# Patient Record
Sex: MALE | Race: WHITE | Employment: FULL TIME | URBAN - METROPOLITAN AREA
[De-identification: names, ages, dates, MRNs, and addresses within clinical notes are randomized per-mention and may not be internally consistent; named-entity substitution may affect disease eponyms.]

---

## 2018-01-06 ENCOUNTER — OFFICE VISIT (OUTPATIENT)
Dept: URGENT CARE | Facility: URGENT CARE | Age: 23
End: 2018-01-06
Payer: COMMERCIAL

## 2018-01-06 VITALS
TEMPERATURE: 97.6 F | SYSTOLIC BLOOD PRESSURE: 122 MMHG | OXYGEN SATURATION: 95 % | HEART RATE: 97 BPM | WEIGHT: 132.2 LBS | DIASTOLIC BLOOD PRESSURE: 73 MMHG | BODY MASS INDEX: 17.44 KG/M2

## 2018-01-06 DIAGNOSIS — J01.90 ACUTE SINUSITIS WITH SYMPTOMS > 10 DAYS: Primary | ICD-10-CM

## 2018-01-06 PROCEDURE — 99213 OFFICE O/P EST LOW 20 MIN: CPT | Performed by: PHYSICIAN ASSISTANT

## 2018-01-06 RX ORDER — FLUTICASONE PROPIONATE 50 MCG
1-2 SPRAY, SUSPENSION (ML) NASAL DAILY
Qty: 1 BOTTLE | Refills: 11 | Status: SHIPPED | OUTPATIENT
Start: 2018-01-06

## 2018-01-06 RX ORDER — DOXYCYCLINE HYCLATE 100 MG
100 TABLET ORAL 2 TIMES DAILY
Qty: 20 TABLET | Refills: 0 | Status: SHIPPED | OUTPATIENT
Start: 2018-01-06

## 2018-01-06 ASSESSMENT — ENCOUNTER SYMPTOMS
MUSCULOSKELETAL NEGATIVE: 1
EYES NEGATIVE: 1
NEUROLOGICAL NEGATIVE: 1
CARDIOVASCULAR NEGATIVE: 1
GASTROINTESTINAL NEGATIVE: 1
PSYCHIATRIC NEGATIVE: 1

## 2018-01-06 NOTE — PROGRESS NOTES
SUBJECTIVE:   Shay Cordon is a 22 year old male presenting with a chief complaint of   Chief Complaint   Patient presents with     Sinus Problem     Patient complains of sinus problems   .    Onset of symptoms was 1 week(s) ago.  Course of illness is worsening.    Severity moderate  Current and Associated symptoms: runny nose, dry throat, headache, pressure in head, sneezing  Treatment measures tried include Tylenol/Ibuprofen.  Predisposing factors include None.    Exposed to someone with a sinus infection  Runny nose, stuffy, scratchy throat, headache, ear pain  No fever    Review of Systems   Constitutional:        As in HPI   HENT:        As in HPI   Eyes: Negative.    Respiratory:        As in HPI   Cardiovascular: Negative.    Gastrointestinal: Negative.    Genitourinary: Negative.    Musculoskeletal: Negative.    Skin: Negative.    Neurological: Negative.    Psychiatric/Behavioral: Negative.          Past Medical History:   Diagnosis Date     ADHD (attention deficit hyperactivity disorder)     Dx      Skull fracture (H)      Current Outpatient Prescriptions   Medication Sig Dispense Refill     doxycycline (VIBRA-TABS) 100 MG tablet Take 1 tablet (100 mg) by mouth 2 times daily 20 tablet 0     fluticasone (FLONASE) 50 MCG/ACT spray Spray 1-2 sprays into both nostrils daily 1 Bottle 11     ibuprofen (ADVIL,MOTRIN) 200 MG tablet Take 600 mg by mouth every 4 hours as needed.       MELATONIN PO        Magic Mouthwash (FV std formula) lidocaine visc 2% 2.5mL/5mL & maalox/mylanta w/ simeth 2.5mL/5mL & diphenhydrAMINE 5mg/5mL 1:1:1   Si-3 tsp swish and spit every 4 hours prn pain. (Patient not taking: Reported on 2018) 150 mL 1     Social History   Substance Use Topics     Smoking status: Never Smoker     Smokeless tobacco: Never Used     Alcohol use No       OBJECTIVE  /73 (BP Location: Left arm, Patient Position: Chair, Cuff Size: Adult Regular)  Pulse 97  Temp 97.6  F (36.4  C) (Oral)   Wt 132 lb 3.2 oz (60 kg)  SpO2 95%  BMI 17.44 kg/m2    Physical Exam   Constitutional: He is oriented to person, place, and time and well-developed, well-nourished, and in no distress.   HENT:   Head: Normocephalic and atraumatic.   Right Ear: Ear canal normal. A middle ear effusion is present.   Left Ear: Tympanic membrane and ear canal normal.   Nose: Mucosal edema and rhinorrhea present. Right sinus exhibits no maxillary sinus tenderness and no frontal sinus tenderness. Left sinus exhibits no maxillary sinus tenderness and no frontal sinus tenderness.   Mouth/Throat: Uvula is midline, oropharynx is clear and moist and mucous membranes are normal.   Eyes: Conjunctivae and EOM are normal. Pupils are equal, round, and reactive to light.   Neck: Normal range of motion. Neck supple.   Cardiovascular: Normal rate, regular rhythm and normal heart sounds.    Pulmonary/Chest: Effort normal and breath sounds normal.   Neurological: He is alert and oriented to person, place, and time. Gait normal.   Skin: Skin is warm and dry.   Psychiatric: Mood and affect normal.       Labs:  No results found for this or any previous visit (from the past 24 hour(s)).        ASSESSMENT:      ICD-10-CM    1. Acute sinusitis with symptoms > 10 days J01.90 doxycycline (VIBRA-TABS) 100 MG tablet     fluticasone (FLONASE) 50 MCG/ACT spray        Medical Decision Making:    Patient will wait to start antibiotic for 2 days (until he has hd symptoms for a total of 10 days).    PLAN:    URI Adult:  Tylenol, Ibuprofen, Fluids, Rest, RX sinusitis  Doxycycline 100 mg bid x 10 days , Flonase, and Neti pot    Followup:    If not improving or if condition worsens, follow up with your Primary Care Provider    There are no Patient Instructions on file for this visit.    Yue Hernandez PA-C

## 2018-01-06 NOTE — NURSING NOTE
"Chief Complaint   Patient presents with     Sinus Problem     Patient complains of sinus problems       Initial /73 (BP Location: Left arm, Patient Position: Chair, Cuff Size: Adult Regular)  Pulse 97  Temp 97.6  F (36.4  C) (Oral)  Wt 132 lb 3.2 oz (60 kg)  SpO2 95%  BMI 17.44 kg/m2 Estimated body mass index is 17.44 kg/(m^2) as calculated from the following:    Height as of 6/12/14: 6' 1\" (1.854 m).    Weight as of this encounter: 132 lb 3.2 oz (60 kg).  Medication Reconciliation: complete       Aby Odom    "

## 2018-01-06 NOTE — MR AVS SNAPSHOT
"              After Visit Summary   2018    Shay Cordon    MRN: [id number]           Patient Information     Date Of Birth          1995        Visit Information        Provider Department      2018 1:15 PM Yue Hernandez PA-C [facility]        Today's Diagnoses     Acute sinusitis with symptoms > 10 days    -  1       Follow-ups after your visit        Who to contact     If you have questions or need follow up information about today's clinic visit or your schedule please contact [facility] directly at [phone].  Normal or non-critical lab and imaging results will be communicated to you by VoterTidehart, letter or phone within 4 business days after the clinic has received the results. If you do not hear from us within 7 days, please contact the clinic through VoterTidehart or phone. If you have a critical or abnormal lab result, we will notify you by phone as soon as possible.  Submit refill requests through CrowdMed or call your pharmacy and they will forward the refill request to us. Please allow 3 business days for your refill to be completed.          Additional Information About Your Visit        MyChart Information     CrowdMed lets you send messages to your doctor, view your test results, renew your prescriptions, schedule appointments and more. To sign up, go to [email or web address] . Click on \"Log in\" on the left side of the screen, which will take you to the Welcome page. Then click on \"Sign up Now\" on the right side of the page.     You will be asked to enter the access code listed below, as well as some personal information. Please follow the directions to create your username and password.     Your access code is: [email or web address]  Expires: 2018  1:50 PM     Your access code will  in 90 days. If you need help or a new code, please call your [facility] or [phone].        Care EveryWhere ID     This is your Care EveryWhere ID. " This could be used by other organizations to access your [place] medical records  [id number]        Your Vitals Were     Pulse Temperature Pulse Oximetry BMI (Body Mass Index)          97 97.6  F (36.4  C) (Oral) 95% 17.44 kg/m2         Blood Pressure from Last 3 Encounters:   01/06/18 122/73   11/09/15 114/67   11/19/14 127/78    Weight from Last 3 Encounters:   01/06/18 132 lb 3.2 oz (60 kg)   11/09/15 129 lb (58.5 kg)   11/19/14 134 lb (60.8 kg)              Today, you had the following     No orders found for display         Today's Medication Changes          These changes are accurate as of: 1/6/18  1:50 PM.  If you have any questions, ask your nurse or doctor.               Start taking these medicines.        Dose/Directions    doxycycline 100 MG tablet   Commonly known as:  VIBRA-TABS   Used for:  Acute sinusitis with symptoms > 10 days   Started by:  Yue Hernandez PA-C        Dose:  100 mg   Take 1 tablet (100 mg) by mouth 2 times daily   Quantity:  20 tablet   Refills:  0       fluticasone 50 MCG/ACT spray   Commonly known as:  FLONASE   Used for:  Acute sinusitis with symptoms > 10 days   Started by:  Yue Hernandez PA-C        Dose:  1-2 spray   Spray 1-2 sprays into both nostrils daily   Quantity:  1 Bottle   Refills:  11            Where to get your medicines      These medications were sent to [id number] [place], [place] - [address] [address]     Phone:  [phone]     doxycycline 100 MG tablet    fluticasone 50 MCG/ACT spray                Primary Care Provider    Silvia Bear PA-C       No address on file        Equal Access to Services     [place] AH: Hadii sherine Mcdaniels, waaxda luqadaha, qaybta kaalmada adeegyada, ronnie carrion. So [facility] [phone].    ATENCIÓN: Si habla español, tiene a damico disposición servicios gratuitos de asistencia lingüística. Llame al  [phone].    We comply with applicable federal civil rights laws and [place] laws. We do not discriminate on the basis of race, color, national origin, age, disability, sex, sexual orientation, or gender identity.            Thank you!     Thank you for choosing [facility]  for your care. Our goal is always to provide you with excellent care. Hearing back from our patients is one way we can continue to improve our services. Please take a few minutes to complete the written survey that you may receive in the mail after your visit with us. Thank you!             Your Updated Medication List - Protect others around you: Learn how to safely use, store and throw away your medicines at www.disposemymeds.org.          This list is accurate as of: 18  1:50 PM.  Always use your most recent med list.                   Brand Name Dispense Instructions for use Diagnosis    doxycycline 100 MG tablet    VIBRA-TABS    20 tablet    Take 1 tablet (100 mg) by mouth 2 times daily    Acute sinusitis with symptoms > 10 days       fluticasone 50 MCG/ACT spray    FLONASE    1 Bottle    Spray 1-2 sprays into both nostrils daily    Acute sinusitis with symptoms > 10 days       ibuprofen 200 MG tablet    ADVIL/MOTRIN     Take 600 mg by mouth every 4 hours as needed.        lidocaine visc 2% 2.5mL/5mL & maalox/mylanta w/ simeth 2.5mL/5mL & diphenhydrAMINE 5mg/5mL Susp suspension    [facility] Mouthwash [facility]    150 mL    1:1:1  Si-3 tsp swish and spit every 4 hours prn pain.    Throat pain       MELATONIN PO

## 2018-01-25 ENCOUNTER — ALLIED HEALTH/NURSE VISIT (OUTPATIENT)
Dept: URGENT CARE | Facility: URGENT CARE | Age: 23
End: 2018-01-25
Payer: COMMERCIAL

## 2018-01-25 VITALS
TEMPERATURE: 101.3 F | OXYGEN SATURATION: 98 % | BODY MASS INDEX: 17.28 KG/M2 | DIASTOLIC BLOOD PRESSURE: 76 MMHG | HEART RATE: 114 BPM | WEIGHT: 131 LBS | SYSTOLIC BLOOD PRESSURE: 115 MMHG

## 2018-01-25 DIAGNOSIS — J10.1 INFLUENZA A: Primary | ICD-10-CM

## 2018-01-25 PROCEDURE — 99213 OFFICE O/P EST LOW 20 MIN: CPT | Performed by: PHYSICIAN ASSISTANT

## 2018-01-25 RX ORDER — OSELTAMIVIR PHOSPHATE 75 MG/1
75 CAPSULE ORAL 2 TIMES DAILY
Qty: 10 CAPSULE | Refills: 0 | Status: SHIPPED | OUTPATIENT
Start: 2018-01-25

## 2018-01-25 ASSESSMENT — ENCOUNTER SYMPTOMS
GASTROINTESTINAL NEGATIVE: 1
PSYCHIATRIC NEGATIVE: 1
MUSCULOSKELETAL NEGATIVE: 1
CARDIOVASCULAR NEGATIVE: 1
NEUROLOGICAL NEGATIVE: 1
EYES NEGATIVE: 1

## 2018-01-25 NOTE — LETTER
[facility]  [address]  [facility] [address]  Phone: [phone]    January 25, 2018        Shay Cordon  [address] [place] [address]          To whom it may concern:    RE: Shay Cordon    Patient was seen and treated today at our clinic and missed work.  Please excuse him from work on 1/25 and 1/26.    Please contact me for questions or concerns.      Sincerely,        Yue Hernandez PA-C

## 2018-01-25 NOTE — MR AVS SNAPSHOT
"              After Visit Summary   2018    Shay Cordon    MRN: [id number]           Patient Information     Date Of Birth          1995        Visit Information        Provider Department      2018 5:15 PM Yue Hernandez PA-C [facility]        Today's Diagnoses     Influenza A    -  1       Follow-ups after your visit        Who to contact     If you have questions or need follow up information about today's clinic visit or your schedule please contact [facility] directly at [phone].  Normal or non-critical lab and imaging results will be communicated to you by Jamanhart, letter or phone within 4 business days after the clinic has received the results. If you do not hear from us within 7 days, please contact the clinic through Jamanhart or phone. If you have a critical or abnormal lab result, we will notify you by phone as soon as possible.  Submit refill requests through [email or web address] or call your pharmacy and they will forward the refill request to us. Please allow 3 business days for your refill to be completed.          Additional Information About Your Visit        MyChart Information     [email or web address] lets you send messages to your doctor, view your test results, renew your prescriptions, schedule appointments and more. To sign up, go to [email or web address] . Click on \"Log in\" on the left side of the screen, which will take you to the Welcome page. Then click on \"Sign up Now\" on the right side of the page.     You will be asked to enter the access code listed below, as well as some personal information. Please follow the directions to create your username and password.     Your access code is: [email or web address]  Expires: 2018  1:50 PM     Your access code will  in 90 days. If you need help or a new code, please call your [facility] or [phone].        Care EveryWhere ID     This is your Care EveryWhere ID. This could be used by other " organizations to access your [place] medical records  [id number]        Your Vitals Were     Pulse Temperature Pulse Oximetry BMI (Body Mass Index)          114 101.3  F (38.5  C) (Oral) 98% 17.28 kg/m2         Blood Pressure from Last 3 Encounters:   01/25/18 115/76   01/06/18 122/73   11/09/15 114/67    Weight from Last 3 Encounters:   01/25/18 131 lb (59.4 kg)   01/06/18 132 lb 3.2 oz (60 kg)   11/09/15 129 lb (58.5 kg)              Today, you had the following     No orders found for display         Today's Medication Changes          These changes are accurate as of 1/25/18  6:51 PM.  If you have any questions, ask your nurse or doctor.               Start taking these medicines.        Dose/Directions    oseltamivir 75 MG capsule   Commonly known as:  TAMIFLU   Used for:  Influenza A   Started by:  Yue Hernandez PA-C        Dose:  75 mg   Take 1 capsule (75 mg) by mouth 2 times daily   Quantity:  10 capsule   Refills:  0            Where to get your medicines      These medications were sent to [id number] - [place], [place] - [address] [address]     Phone:  [phone]     oseltamivir 75 MG capsule                Primary Care Provider    Silvia Bear PA-C       No address on file        Equal Access to Services     [place] AH: Hadii sherine arellano hadasho Sofloresali, waaxda luqadaha, qaybta kaalmada adeegyada, ronnie carrion. So [facility] [phone].    ATENCIÓN: Si habla español, tiene a damico disposición servicios gratuitos de asistencia lingüística. Llame al [phone].    We comply with applicable federal civil rights laws and [place] laws. We do not discriminate on the basis of race, color, national origin, age, disability, sex, sexual orientation, or gender identity.            Thank you!     Thank you for choosing [facility]  for your care. Our goal is always to provide you with excellent  care. Hearing back from our patients is one way we can continue to improve our services. Please take a few minutes to complete the written survey that you may receive in the mail after your visit with us. Thank you!             Your Updated Medication List - Protect others around you: Learn how to safely use, store and throw away your medicines at www.disposemymeds.org.          This list is accurate as of 18  6:51 PM.  Always use your most recent med list.                   Brand Name Dispense Instructions for use Diagnosis    doxycycline 100 MG tablet    VIBRA-TABS    20 tablet    Take 1 tablet (100 mg) by mouth 2 times daily    Acute sinusitis with symptoms > 10 days       fluticasone 50 MCG/ACT spray    FLONASE    1 Bottle    Spray 1-2 sprays into both nostrils daily    Acute sinusitis with symptoms > 10 days       ibuprofen 200 MG tablet    ADVIL/MOTRIN     Take 600 mg by mouth every 4 hours as needed.        lidocaine visc 2% 2.5mL/5mL & maalox/mylanta w/ simeth 2.5mL/5mL & diphenhydrAMINE 5mg/5mL Susp suspension    [facility] Mouthwash [facility]    150 mL    1:1:1  Si-3 tsp swish and spit every 4 hours prn pain.    Throat pain       MELATONIN PO           oseltamivir 75 MG capsule    TAMIFLU    10 capsule    Take 1 capsule (75 mg) by mouth 2 times daily    Influenza A

## 2018-01-26 NOTE — PROGRESS NOTES
SUBJECTIVE:   Shay Cordon is a 22 year old male presenting with a chief complaint of   Chief Complaint   Patient presents with     Flu     Patient complains of flu symptoms   .    Onset of symptoms was 1 day(s) ago.  Course of illness is worsening.    Severity moderate  Current and Associated symptoms: fever, chills, headache, cough  Treatment measures tried include Tylenol, last dose mid day without improvement   Predisposing factors include None.    Runny nose  Started this morning  Exposure to flu at work  Sore throat    Review of Systems   Constitutional:        As in HPI   HENT:        As in HPI   Eyes: Negative.    Respiratory:        As in HPI   Cardiovascular: Negative.    Gastrointestinal: Negative.    Genitourinary: Negative.    Musculoskeletal: Negative.    Skin: Negative.    Neurological: Negative.    Psychiatric/Behavioral: Negative.          Past Medical History:   Diagnosis Date     ADHD (attention deficit hyperactivity disorder)     Dx      Skull fracture (H)      Current Outpatient Prescriptions   Medication Sig Dispense Refill     oseltamivir (TAMIFLU) 75 MG capsule Take 1 capsule (75 mg) by mouth 2 times daily 10 capsule 0     doxycycline (VIBRA-TABS) 100 MG tablet Take 1 tablet (100 mg) by mouth 2 times daily 20 tablet 0     fluticasone (FLONASE) 50 MCG/ACT spray Spray 1-2 sprays into both nostrils daily 1 Bottle 11     MELATONIN PO        Magic Mouthwash (FV std formula) lidocaine visc 2% 2.5mL/5mL & maalox/mylanta w/ simeth 2.5mL/5mL & diphenhydrAMINE 5mg/5mL 1:1:1   Si-3 tsp swish and spit every 4 hours prn pain. 150 mL 1     ibuprofen (ADVIL,MOTRIN) 200 MG tablet Take 600 mg by mouth every 4 hours as needed.       Social History   Substance Use Topics     Smoking status: Never Smoker     Smokeless tobacco: Never Used     Alcohol use No       OBJECTIVE  /76 (BP Location: Left arm, Patient Position: Chair, Cuff Size: Adult Regular)  Pulse 114  Temp 101.3  F (38.5  C)  (Oral)  Wt 131 lb (59.4 kg)  SpO2 98%  BMI 17.28 kg/m2    Physical Exam   Constitutional: He is oriented to person, place, and time and well-developed, well-nourished, and in no distress.   HENT:   Head: Normocephalic and atraumatic.   Right Ear: Tympanic membrane and ear canal normal.   Left Ear: Tympanic membrane and ear canal normal.   Nose: Rhinorrhea present.   Mouth/Throat: Uvula is midline and mucous membranes are normal. Posterior oropharyngeal erythema present. No posterior oropharyngeal edema.   Eyes: Conjunctivae and EOM are normal. Pupils are equal, round, and reactive to light.   Neck: Normal range of motion. Neck supple.   Cardiovascular: Normal rate, regular rhythm and normal heart sounds.    Pulmonary/Chest: Effort normal and breath sounds normal.   Neurological: He is alert and oriented to person, place, and time. Gait normal.   Skin: Skin is warm and dry.   Psychiatric: Mood and affect normal.       Labs:  No results found for this or any previous visit (from the past 24 hour(s)).        ASSESSMENT:      ICD-10-CM    1. Influenza A J10.1 oseltamivir (TAMIFLU) 75 MG capsule        Medical Decision Making:    Symptoms are classic for influenza  Patient will see what out of pocket cost of Tamiflu is and decide if he wants to take it.     PLAN:    URI Adult:  Tylenol, Ibuprofen, Fluids and Rest    Followup:    If not improving or if condition worsens, follow up with your Primary Care Provider    There are no Patient Instructions on file for this visit.    Yue Hernandez PA-C

## 2018-01-26 NOTE — NURSING NOTE
"Chief Complaint   Patient presents with     Flu     Patient complains of flu symptoms       Initial /76 (BP Location: Left arm, Patient Position: Chair, Cuff Size: Adult Regular)  Pulse 114  Temp 101.3  F (38.5  C) (Oral)  Wt 131 lb (59.4 kg)  SpO2 98%  BMI 17.28 kg/m2 Estimated body mass index is 17.28 kg/(m^2) as calculated from the following:    Height as of 6/12/14: 6' 1\" (1.854 m).    Weight as of this encounter: 131 lb (59.4 kg).  Medication Reconciliation: complete     Aby Odom    "

## 2019-12-16 ENCOUNTER — OFFICE VISIT (OUTPATIENT)
Dept: URGENT CARE | Facility: URGENT CARE | Age: 24
End: 2019-12-16
Payer: COMMERCIAL

## 2019-12-16 VITALS
BODY MASS INDEX: 17.39 KG/M2 | WEIGHT: 131.8 LBS | HEART RATE: 116 BPM | SYSTOLIC BLOOD PRESSURE: 122 MMHG | TEMPERATURE: 98.4 F | OXYGEN SATURATION: 100 % | RESPIRATION RATE: 20 BRPM | DIASTOLIC BLOOD PRESSURE: 84 MMHG

## 2019-12-16 DIAGNOSIS — H10.022 PINK EYE DISEASE OF LEFT EYE: Primary | ICD-10-CM

## 2019-12-16 PROCEDURE — 99213 OFFICE O/P EST LOW 20 MIN: CPT | Performed by: PHYSICIAN ASSISTANT

## 2019-12-16 RX ORDER — POLYMYXIN B SULFATE AND TRIMETHOPRIM 1; 10000 MG/ML; [USP'U]/ML
1-2 SOLUTION OPHTHALMIC EVERY 6 HOURS
Qty: 3 ML | Refills: 0 | Status: SHIPPED | OUTPATIENT
Start: 2019-12-16 | End: 2019-12-23

## 2019-12-16 ASSESSMENT — ENCOUNTER SYMPTOMS
PALPITATIONS: 0
CHILLS: 0
MUSCULOSKELETAL NEGATIVE: 1
DIZZINESS: 0
LIGHT-HEADEDNESS: 0
HEMATURIA: 0
CARDIOVASCULAR NEGATIVE: 1
EYE DISCHARGE: 1
FREQUENCY: 0
DIARRHEA: 0
HEADACHES: 0
DYSURIA: 0
GASTROINTESTINAL NEGATIVE: 1
FEVER: 0
WHEEZING: 0
RESPIRATORY NEGATIVE: 1
EYE ITCHING: 0
MYALGIAS: 0
ABDOMINAL PAIN: 0
POLYDIPSIA: 0
RHINORRHEA: 0
ADENOPATHY: 0
ENDOCRINE NEGATIVE: 1
CHEST TIGHTNESS: 0
DIAPHORESIS: 0
SHORTNESS OF BREATH: 0
WEAKNESS: 0
VOMITING: 0
NEUROLOGICAL NEGATIVE: 1
CONSTITUTIONAL NEGATIVE: 1
NAUSEA: 0
COUGH: 0
SORE THROAT: 0
EYE REDNESS: 1

## 2019-12-16 ASSESSMENT — PAIN SCALES - GENERAL: PAINLEVEL: NO PAIN (0)

## 2019-12-17 NOTE — PROGRESS NOTES
Chief Complaint:      Chief Complaint   Patient presents with     Conjunctivitis     left eye-started on Sat. but he noticed it on Sun., alot of watery and couldn't open eye yesterday-feel better today per patient        HPI: Shay Cordon is a 24 year old male presenting with left eye discharge, mattering, redness  for the past 2 days.  There has not been exposure to pink eye.  There has not been trauma to the eye.    Shay Cordon does not wear contact lenses.    No problems with vision, or eye pain.    No fever, abdominal pain, diarrhea or vomiting.  No cough, chest pain or shortness of breath.    ROS:     Review of Systems   Constitutional: Negative.  Negative for chills, diaphoresis and fever.   HENT: Negative.  Negative for congestion, ear pain, rhinorrhea and sore throat.    Eyes: Positive for discharge and redness. Negative for itching.   Respiratory: Negative.  Negative for cough, chest tightness, shortness of breath and wheezing.    Cardiovascular: Negative.  Negative for chest pain and palpitations.   Gastrointestinal: Negative.  Negative for abdominal pain, diarrhea, nausea and vomiting.   Endocrine: Negative.  Negative for polydipsia and polyuria.   Genitourinary: Negative for dysuria, frequency, hematuria and urgency.   Musculoskeletal: Negative.  Negative for myalgias.   Skin: Negative for rash.   Allergic/Immunologic: Negative for immunocompromised state.   Neurological: Negative.  Negative for dizziness, weakness, light-headedness and headaches.   Hematological: Negative for adenopathy.           Family History   Family History   Problem Relation Age of Onset     Diabetes Father      Gastrointestinal Disease Maternal Grandmother         Problem history  Patient Active Problem List   Diagnosis     Attention deficit disorder of childhood     Left otitis media        Allergies  Allergies   Allergen Reactions     Amoxicillin Diarrhea        Social History  Social History     Socioeconomic History      Marital status: Single     Spouse name: Not on file     Number of children: Not on file     Years of education: Not on file     Highest education level: Not on file   Occupational History     Not on file   Social Needs     Financial resource strain: Not on file     Food insecurity:     Worry: Not on file     Inability: Not on file     Transportation needs:     Medical: Not on file     Non-medical: Not on file   Tobacco Use     Smoking status: Current Every Day Smoker     Smokeless tobacco: Never Used   Substance and Sexual Activity     Alcohol use: Yes     Drug use: Never     Sexual activity: Never   Lifestyle     Physical activity:     Days per week: Not on file     Minutes per session: Not on file     Stress: Not on file   Relationships     Social connections:     Talks on phone: Not on file     Gets together: Not on file     Attends Zoroastrianism service: Not on file     Active member of club or organization: Not on file     Attends meetings of clubs or organizations: Not on file     Relationship status: Not on file     Intimate partner violence:     Fear of current or ex partner: Not on file     Emotionally abused: Not on file     Physically abused: Not on file     Forced sexual activity: Not on file   Other Topics Concern     Parent/sibling w/ CABG, MI or angioplasty before 65F 55M? Not Asked   Social History Narrative     Not on file        Current Meds    Current Outpatient Medications:      trimethoprim-polymyxin b (POLYTRIM) 59780-5.1 UNIT/ML-% ophthalmic solution, Place 1-2 drops Into the left eye every 6 hours for 7 days, Disp: 3 mL, Rfl: 0     doxycycline (VIBRA-TABS) 100 MG tablet, Take 1 tablet (100 mg) by mouth 2 times daily (Patient not taking: Reported on 12/16/2019), Disp: 20 tablet, Rfl: 0     fluticasone (FLONASE) 50 MCG/ACT spray, Spray 1-2 sprays into both nostrils daily (Patient not taking: Reported on 12/16/2019), Disp: 1 Bottle, Rfl: 11     ibuprofen (ADVIL,MOTRIN) 200 MG tablet, Take 600 mg by  mouth every 4 hours as needed., Disp: , Rfl:      Magic Mouthwash (FV std formula) lidocaine visc 2% 2.5mL/5mL & maalox/mylanta w/ simeth 2.5mL/5mL & diphenhydrAMINE 5mg/5mL, 1:1:1  Si-3 tsp swish and spit every 4 hours prn pain. (Patient not taking: Reported on 2019), Disp: 150 mL, Rfl: 1     MELATONIN PO, , Disp: , Rfl:      oseltamivir (TAMIFLU) 75 MG capsule, Take 1 capsule (75 mg) by mouth 2 times daily (Patient not taking: Reported on 2019), Disp: 10 capsule, Rfl: 0          OBJECTIVE     Vital signs reviewed by Matthew Warren PA-C  /84 (BP Location: Left arm, Patient Position: Sitting, Cuff Size: Adult Regular)   Pulse 116   Temp 98.4  F (36.9  C) (Oral)   Resp 20   Wt 59.8 kg (131 lb 12.8 oz)   SpO2 100%   BMI 17.39 kg/m       Physical Exam  Vitals signs and nursing note reviewed.   Constitutional:       General: He is not in acute distress.     Appearance: He is well-developed. He is not ill-appearing, toxic-appearing or diaphoretic.   HENT:      Head: Normocephalic and atraumatic.      Right Ear: Hearing, tympanic membrane, ear canal and external ear normal. Tympanic membrane is not perforated, erythematous, retracted or bulging.      Left Ear: Hearing, tympanic membrane, ear canal and external ear normal. Tympanic membrane is not perforated, erythematous, retracted or bulging.      Nose: Nose normal. No mucosal edema or rhinorrhea.      Mouth/Throat:      Pharynx: No oropharyngeal exudate or posterior oropharyngeal erythema.      Tonsils: No tonsillar exudate or tonsillar abscesses. Swellin on the right. 0 on the left.   Eyes:      Conjunctiva/sclera:      Right eye: Right conjunctiva is not injected. No chemosis or exudate.     Left eye: Left conjunctiva is injected. No chemosis or exudate.     Pupils: Pupils are equal, round, and reactive to light.   Neck:      Musculoskeletal: Normal range of motion and neck supple.   Cardiovascular:      Rate and Rhythm: Normal rate  and regular rhythm.      Heart sounds: Normal heart sounds, S1 normal and S2 normal. Heart sounds not distant. No murmur. No friction rub. No gallop.    Pulmonary:      Effort: Pulmonary effort is normal. No respiratory distress.      Breath sounds: Normal breath sounds. No decreased breath sounds, wheezing, rhonchi or rales.   Abdominal:      General: Bowel sounds are normal. There is no distension.      Palpations: Abdomen is soft.      Tenderness: There is no abdominal tenderness.   Lymphadenopathy:      Cervical: No cervical adenopathy.   Skin:     General: Skin is warm and dry.      Findings: No rash.   Neurological:      Mental Status: He is alert.      Cranial Nerves: No cranial nerve deficit.   Psychiatric:         Attention and Perception: He is attentive.         Speech: Speech normal.         Behavior: Behavior normal. Behavior is cooperative.         Thought Content: Thought content normal.         Judgment: Judgment normal.            Medical Decision Making:    Differential Diagnosis:  Eye Problem: Bacterial conjunctivitis  Viral conjunctivitis  Allergic conjunctivitis     ASSESSMENT     1. Pink eye disease of left eye         PLAN  Rx for Polytrim drops  Artifical tears for irritation  Warm compresses with baby shampoo for mattering.   If symptoms are not improving follow up with your eye doctor in 2-3 days.  See your eye doctor immediately if symptoms worsen.  If contact wearer, do not wear contacts for next 7 days.  Throw old contacts away.  Worrisome symptoms discussed with instructions to go to the ED.  Patient verbalized understanding and agreed with this plan.     Matthew Warren PA-C  12/16/2019, 6:07 PM

## 2020-08-02 ENCOUNTER — OFFICE VISIT (OUTPATIENT)
Dept: URGENT CARE | Facility: URGENT CARE | Age: 25
End: 2020-08-02
Payer: COMMERCIAL

## 2020-08-02 VITALS
OXYGEN SATURATION: 98 % | SYSTOLIC BLOOD PRESSURE: 125 MMHG | TEMPERATURE: 98.5 F | BODY MASS INDEX: 17.17 KG/M2 | DIASTOLIC BLOOD PRESSURE: 76 MMHG | WEIGHT: 130.13 LBS | RESPIRATION RATE: 20 BRPM | HEART RATE: 92 BPM

## 2020-08-02 DIAGNOSIS — J02.9 SORE THROAT: Primary | ICD-10-CM

## 2020-08-02 DIAGNOSIS — M79.10 MYALGIA: ICD-10-CM

## 2020-08-02 DIAGNOSIS — R09.81 NASAL CONGESTION: ICD-10-CM

## 2020-08-02 LAB
DEPRECATED S PYO AG THROAT QL EIA: NEGATIVE
SPECIMEN SOURCE: NORMAL
SPECIMEN SOURCE: NORMAL
STREP GROUP A PCR: NOT DETECTED

## 2020-08-02 PROCEDURE — 99213 OFFICE O/P EST LOW 20 MIN: CPT | Performed by: NURSE PRACTITIONER

## 2020-08-02 PROCEDURE — 87651 STREP A DNA AMP PROBE: CPT | Performed by: NURSE PRACTITIONER

## 2020-08-02 NOTE — PATIENT INSTRUCTIONS
Patient Education     Coronavirus Disease 2019 (COVID-19): Caring for Yourself or Others  If you or a household member have symptoms of COVID-19, follow the guidelines below. This will help you manage symptoms and keep the virus from spreading.  If you have symptoms of COVID-19    Stay home and contact your care team. They will tell you what to do.    Don t panic. Keep in mind that other illnesses can cause similar symptoms.    Stay away from work, school, and public places.    Limit physical contact with others in your home. Limit visitors. No kissing.    Clean surfaces you touch with disinfectant.    If you need to cough or sneeze, do it into a tissue. Then, throw the tissue into the trash. If you don't have tissues, cough or sneeze into the bend of your elbow    Don t share food or personal items with people in your household. This includes items like eating and drinking utensils, towels, and bedding.    Wear a cloth face mask around other people. It should cover your nose and mouth. You may need to make your own mask using a bandana, T-shirt, or other cloth. See the CDC s instructions on how to make a mask.    If you need to go to a hospital or clinic, call ahead to let them know. Expect the care team to wear masks, gowns, gloves, and eye protection. You may be put in a separate room.    Follow all instructions from your care team.  If you ve been diagnosed with COVID-19    Stay home and away from others, including other people in your home. (This is called self-isolation.) Don t leave home unless you need to get medical care. Don't go to work, school, or public places. Don't use buses, taxis, or other public transportation.    Follow all instructions from your care team.    If you need to go to a hospital or clinic, call ahead to let them know. Expect the care team to wear masks, gowns, gloves, and eye protection. You may be put in a separate room.    Wear a face mask over your nose and mouth. This is to  protect others from your germs. If you can t wear a mask, your caregivers should wear one. You may need to make your own mask using a bandana, T-shirt, or other cloth. See the CDC s instructions on how to make a mask.    Have no contact with pets and other animals.    Don t share food or personal items with people in your household. This includes items like eating and drinking utensils, towels, and bedding.    If you need to cough or sneeze, do it into a tissue. Then, throw the tissue into the trash. If you don't have tissues, cough or sneeze into the bend of your elbow.    Wash your hands often.  Self-care at home  At this time, there is no medicine approved to prevent or treat COVID-19. Experts are testing different medicines, trying to find one that works.  So far, the most proven treatment is to support your body while it fights the virus.    Get plenty of rest.    Drink extra fluids (6 to 8 glasses of liquids each day), unless a doctor has told you not to. Ask your care team which fluids are best for you. Avoid fluids that contain caffeine or alcohol.    Take over-the-counter (OTC) medicine to help reduce pain and fever. Your care team will tell you which OTC medicine to use.  If you ve been in the hospital for COVID-19, follow your care team s instructions. This may include changing positions to help your breathing (such as lying on your belly).  If a test showed that you have COVID-19, you may be asked to donate plasma after you ve recovered. (This is called COVID-19 convalescent plasma donation.) The plasma may contain antibodies to help fight the virus in others. Scientists are testing whether this might be a treatment in the future. For more information, talk to your care team.  Caring for a sick person    Follow all instructions from the care team.    Wash your hands often.    Wear protective clothing as advised.    Make sure the sick person wears a mask. If they can't wear a mask, don't stay in the same  room with them. If you must be in the same room, wear a face mask. Make sure the mask covers both the nose and mouth.    Keep track of the sick person s symptoms.    Clean surfaces often with disinfectant. This includes phones, kitchen counters, fridge door handles, bathroom surfaces, and others.    Don t let anyone share household items with the sick person. This includes eating and drinking tools, towels, sheets, and blankets.    Clean fabrics and laundry well.    Keep other people and pets away from the sick person.  When you can stop self-isolation  When you are sick with COVID-19, you should stay away from other people. This is called self-isolation. The rules for ending self-isolation depend on your health in general.  If you are normally healthy  You can stop self-isolation when all 3 of these are true:    You ve had no fever--and no medicine that reduces fever--for 3 full days (72 hours). And     Your symptoms are better, such as cough or trouble breathing. And     At least 10 days have passed since your symptoms first started.  Talk with your care team before you leave home. They may tell you it s okay to leave, or they may give you different advice.  If you have a weak immune system  If you re being treated for cancer, have an immune disorder (such as HIV), or have had a transplant (organ or bone marrow), follow your care team s instructions. You may be able to end self-isolation when all 3 of these are true:    You ve had no fever--and no medicine that reduces fever--for 3 full days (72 hours). And     Your symptoms are better, such as cough or trouble breathing. And     You ve had 2 tests for COVID-19. The tests happened at least 24 hours apart, and both show that you don t have the virus. (If no tests are available, your care team may tell you to follow the rules for normally healthy people above.)  When to call your care team  Call your care team right away if a sick person has any of these:    Trouble  breathing    Pain or pressure in the chest  If a sick person has any of these, call 911:    Trouble breathing that gets worse    Pain or pressure in the chest that gets worse    Blue tint to lips or face    Fast or irregular heartbeat    Confusion or trouble waking    Fainting or loss of consciousness    Coughing up blood  Going home from the hospital  If you have COVID-19 and were recently in the hospital:    Follow the instructions above for self-care and isolation.    Follow the hospital care team s instructions.    Ask questions if anything is unclear to you. Write down answers so you remember them.  Last modified date: 5/8/2020  This information has been modified by your health care provider with permission from the publisher.      [id number] [facility], [address] [place] [address], [place] [address]. All rights reserved. This information is not intended as a substitute for professional medical care. Always follow your healthcare professional's instructions. This information has been modified by your health care provider with permission from the publisher.

## 2020-08-02 NOTE — PROGRESS NOTES
SUBJECTIVE: 24 year old male with sore throat,+nasal congestion, swollen glands, headache for 1 days. No history of rheumatic fever. Other symptoms: pain while swallowing and strep exposure. He has not tired any OTC medications.    Medications and allergies reviewed.    No Significant PMH.    OBJECTIVE:   Vitals as noted above.  Appears alert and mild distress.  Ears: normal  Oropharynx: moderate erythema and exudates present  Neck: normal, supple and moderate nontender anterior cervical nodes  Lungs: chest clear to IPPA and clear to IPPA    Negative Rapid Strep.    ASSESSMENT: Viral pharyngitis    PLAN: Get tested for COVID 19. Order placed. Gargle, use acetaminophen or other OTC analgesic. See prn.    ALBERT Cornell, CNP  [facility]

## 2020-08-03 ENCOUNTER — AMBULATORY - HEALTHEAST (OUTPATIENT)
Dept: FAMILY MEDICINE | Facility: CLINIC | Age: 25
End: 2020-08-03

## 2020-08-03 ENCOUNTER — OFFICE VISIT (OUTPATIENT)
Dept: URGENT CARE | Facility: URGENT CARE | Age: 25
End: 2020-08-03
Payer: COMMERCIAL

## 2020-08-03 VITALS
BODY MASS INDEX: 17.2 KG/M2 | HEART RATE: 90 BPM | TEMPERATURE: 98.3 F | OXYGEN SATURATION: 99 % | WEIGHT: 130.4 LBS | DIASTOLIC BLOOD PRESSURE: 77 MMHG | SYSTOLIC BLOOD PRESSURE: 135 MMHG | RESPIRATION RATE: 20 BRPM

## 2020-08-03 DIAGNOSIS — Z20.822 SUSPECTED COVID-19 VIRUS INFECTION: ICD-10-CM

## 2020-08-03 DIAGNOSIS — B35.6 JOCK ITCH: Primary | ICD-10-CM

## 2020-08-03 PROCEDURE — 99213 OFFICE O/P EST LOW 20 MIN: CPT | Performed by: PHYSICIAN ASSISTANT

## 2020-08-03 RX ORDER — MICONAZOLE NITRATE 20 MG/G
CREAM TOPICAL 2 TIMES DAILY
Qty: 15 G | Refills: 0 | Status: SHIPPED | OUTPATIENT
Start: 2020-08-03

## 2020-08-03 ASSESSMENT — ENCOUNTER SYMPTOMS
DIZZINESS: 0
ENDOCRINE NEGATIVE: 1
WHEEZING: 0
GASTROINTESTINAL NEGATIVE: 1
WEAKNESS: 0
CARDIOVASCULAR NEGATIVE: 1
CONSTITUTIONAL NEGATIVE: 1
VOMITING: 0
SHORTNESS OF BREATH: 0
COUGH: 0
ABDOMINAL PAIN: 0
EYES NEGATIVE: 1
LIGHT-HEADEDNESS: 0
CHILLS: 0
NEUROLOGICAL NEGATIVE: 1
MYALGIAS: 0
POLYDIPSIA: 0
FEVER: 0
DIAPHORESIS: 0
ADENOPATHY: 0
HEADACHES: 0
EYE ITCHING: 0
FREQUENCY: 0
DIARRHEA: 0
NAUSEA: 0
RESPIRATORY NEGATIVE: 1
MUSCULOSKELETAL NEGATIVE: 1
CHEST TIGHTNESS: 0
EYE REDNESS: 0
DYSURIA: 0
HEMATURIA: 0
PALPITATIONS: 0
SORE THROAT: 0
RHINORRHEA: 0
EYE DISCHARGE: 0

## 2020-08-03 ASSESSMENT — PAIN SCALES - GENERAL: PAINLEVEL: NO PAIN (0)

## 2020-08-03 NOTE — PROGRESS NOTES
"Date: 2020 11:47:11  Clinician: Jacinto Avendano  Clinician NPI: [id number]  Patient: Shay Cordon  Patient : 1995  Patient Address: [address] [place] [address]  Patient Phone: [phone]  Visit Protocol: URI  Patient Summary:  Shay is a 24 year old ( : 1995 ) male who initiated a Visit for COVID-19 (Coronavirus) evaluation and screening. When asked the question \"Please sign me up to receive news, health information and promotions. \", Shay responded \"No\".    Shay states his symptoms started gradually 3-4 days ago.   His symptoms consist of tooth pain, ageusia, diarrhea, myalgia, a sore throat, anosmia, nasal congestion, rhinitis, malaise, and a headache.   Symptom details     Nasal secretions: The color of his mucus is white, green, and yellow.    Sore throat: Shay reports having mild throat pain (1-3 on a 10 point pain scale), has exudate on his tonsils, and can swallow liquids. He is not sure if the lymph nodes in his neck are enlarged. A rash has not appeared on the skin since the sore throat started.     Headache: He states the headache is mild (1-3 on a 10 point pain scale).     Tooth pain: The tooth pain is caused by a cavity, recent dental work, or other mouth problems.      Shay denies having wheezing, nausea, facial pain or pressure, fever, cough, vomiting, ear pain, and chills. He also denies having recent facial or sinus surgery in the past 60 days, double sickening (worsening symptoms after initial improvement), taking antibiotic medication in the past month, and having a sinus infection within the past year. He is not experiencing dyspnea.   Precipitating events  Within the past week, Shay has been exposed to someone with strep throat. He has not recently been exposed to someone with influenza. Shay has been in close contact with the following high risk individuals: adults 65 or older, people with asthma, heart disease or diabetes, immunocompromised people, and " children under the age of 5.   Pertinent COVID-19 (Coronavirus) information  In the past 14 days, Shay has not worked in a congregate living setting.   He does not work or volunteer as healthcare worker or a  and does not work or volunteer in a healthcare facility.   Shay also has not lived in a congregate living setting in the past 14 days. He lives with a healthcare worker.   Shay has not had a close contact with a laboratory-confirmed COVID-19 patient within 14 days of symptom onset.   Pertinent medical history  Shay needs a return to work/school note.   Weight: 130 lbs   Shay smokes or uses smokeless tobacco.   Weight: 130 lbs    MEDICATIONS: No current medications, ALLERGIES: amoxicillin  Clinician Response:  Dear Shay,   Your symptoms show that you may have coronavirus (COVID-19). This illness can cause fever, cough and trouble breathing. Many people get a mild case and get better on their own. Some people can get very sick.  What should I do?  We would like to test you for this virus.   1. Please call [phone] to schedule your visit. Explain that you were referred by [facility] to have a COVID-19 test. Be ready to share [facility].  The following will serve as your written order for this COVID Test, ordered by me, for the indication of suspected COVID [Z20.828]: The test will be ordered in [employer], our electronic health record, after you are scheduled. It will show as ordered and authorized by Carlos Walker MD.  Order: COVID-19 (Coronavirus) PCR for SYMPTOMATIC testing from [facility].      2. When it's time for your COVID test:  Stay at least 6 feet away from others. (If someone will drive you to your test, stay in the backseat, as far away from the  as you can.)   Cover your mouth and nose with a mask, tissue or washcloth.  Go straight to the testing site. Don't make any stops on the way there or back.      3.Starting now: Stay home and away from others (self-isolate) until:    "You've had no fever---and no medicine that reduces fever---for 3 full days (72 hours). And...   Your other symptoms have gotten better. For example, your cough or breathing has improved. And...   At least 10 days have passed since your symptoms started.       During this time, don't leave the house except for testing or medical care.   Stay in your own room, even for meals. Use your own bathroom if you can.   Stay away from others in your home. No hugging, kissing or shaking hands. No visitors.  Don't go to work, school or anywhere else.    Clean \"high touch\" surfaces often (doorknobs, counters, handles, etc.). Use a household cleaning spray or wipes. You'll find a full list of  on the EPA website: www.epa.gov/pesticide-registration/list-n-disinfectants-use-against-sars-cov-2.   Cover your mouth and nose with a mask, tissue or washcloth to avoid spreading germs.  Wash your hands and face often. Use soap and water.  Caregivers in these groups are at risk for severe illness due to COVID-19:  o People 65 years and older  o People who live in a nursing home or long-term care facility  o People with chronic disease (lung, heart, cancer, diabetes, kidney, liver, immunologic)  o People who have a weakened immune system, including those who:   Are in cancer treatment  Take medicine that weakens the immune system, such as corticosteroids  Had a bone marrow or organ transplant  Have an immune deficiency  Have poorly controlled HIV or AIDS  Are obese (body mass index of 40 or higher)  Smoke regularly   o Caregivers should wear gloves while washing dishes, handling laundry and cleaning bedrooms and bathrooms.  o Use caution when washing and drying laundry: Don't shake dirty laundry, and use the warmest water setting that you can.  o For more tips, go to www.cdc.gov/coronavirus/2019-ncov/downloads/10Things.pdf.    4.Sign up for [employer]. We know it's scary to hear that you might have COVID-19. We want to track your " symptoms to make sure you're okay over the next 2 weeks. Please look for an email from [employer]---this is a free, online program that we'll use to keep in touch. To sign up, follow the link in the email. Learn more at [email or web address]  How can I take care of myself?   Get lots of rest. Drink extra fluids (unless a doctor has told you not to).   Take Tylenol (acetaminophen) for fever or pain. If you have liver or kidney problems, ask your family doctor if it's okay to take Tylenol.   Adults can take either:    650 mg (two 325 mg pills) every 4 to 6 hours, or...   1,000 mg (two 500 mg pills) every 8 hours as needed.    Note: Don't take more than 3,000 mg in one day. Acetaminophen is found in many medicines (both prescribed and over-the-counter medicines). Read all labels to be sure you don't take too much.   For children, check the Tylenol bottle for the right dose. The dose is based on the child's age or weight.    If you have other health problems (like cancer, heart failure, an organ transplant or severe kidney disease): Call your specialty clinic if you don't feel better in the next 2 days.       Know when to call 911. Emergency warning signs include:    Trouble breathing or shortness of breath Pain or pressure in the chest that doesn't go away Feeling confused like you haven't felt before, or not being able to wake up Bluish-colored lips or face.  Where can I get more information?   [facility] -- About COVID-19: [email or web address]   CDC -- What to Do If You're Sick: www.cdc.gov/coronavirus/2019-ncov/about/steps-when-sick.html   CDC -- Ending Home Isolation: www.cdc.gov/coronavirus/2019-ncov/hcp/disposition-in-home-patients.html   CDC -- Caring for Someone: www.cdc.gov/coronavirus/2019-ncov/if-you-are-sick/care-for-someone.html   [facility] -- Interim Guidance for Hospital Discharge to Home: www.health.[facility].[place]./diseases/coronavirus/hcp/hospdischarge.pdf   [facility]  [place] clinical trials (COVID-19 research studies): clinicalaffairs.[facility]-clinical-trials    Below are the COVID-19 hotlines at the [place] Department of Health ([facility]). Interpreters are available.    For health questions: Call 910-598-9748 or 1-560.969.1959 (7 a.m. to 7 p.m.) For questions about schools and childcare: Call 259-752-3059 or 1-893.353.9837 (7 a.m. to 7 p.m.)       Rapid Strep Test - [place]    I am sorry you are not feeling well. Based on the information provided, it is possible you could have strep throat. When left untreated, strep can spread to other areas of the body and cause a more serious infection.  A strep test is the only way to determine if a bacterial infection or a virus is causing your sore throat. This is done in a lab where a swab of the back of your throat is collected and tested for the bacteria that causes strep.  Since you chose not to use the lab order, please be seen:     In a clinic or urgent care    Within 24 hours     Call [phone] or go to the emergency room if you suddenly develop a rash, are drooling or unable to swallow fluids, if you are having difficulty breathing, or feel that your throat is closing off.   Diagnosis: Pain in throat  Diagnosis ICD: R07.0  [facility] Results: Rapid Strep Test - [place] - Declined  [facility]T[employer] Secondary Results: null

## 2020-08-03 NOTE — PROGRESS NOTES
"Chief Complaint:    Chief Complaint   Patient presents with     STD     sx with \"inflamed for almost 2 weeks\", denies pain, denies discharge       HPI: Shay Cordon is an 24 year old male who presents for scrotum itching and pain.  Symptoms started roughly 1 week ago.  He has not tried anything for the symptoms.  He denies any drainage from the penis.          ROS:      Review of Systems   Constitutional: Negative.  Negative for chills, diaphoresis and fever.   HENT: Negative.  Negative for congestion, ear pain, rhinorrhea and sore throat.    Eyes: Negative.  Negative for discharge, redness and itching.   Respiratory: Negative.  Negative for cough, chest tightness, shortness of breath and wheezing.    Cardiovascular: Negative.  Negative for chest pain and palpitations.   Gastrointestinal: Negative.  Negative for abdominal pain, diarrhea, nausea and vomiting.   Endocrine: Negative.  Negative for polydipsia and polyuria.   Genitourinary: Negative for discharge, dysuria, frequency, hematuria, penile pain, penile swelling, scrotal swelling, testicular pain and urgency.   Musculoskeletal: Negative.  Negative for myalgias.   Skin: Positive for rash.   Allergic/Immunologic: Negative for immunocompromised state.   Neurological: Negative.  Negative for dizziness, weakness, light-headedness and headaches.   Hematological: Negative for adenopathy.        Family History   Family History   Problem Relation Age of Onset     Diabetes Father      Gastrointestinal Disease Maternal Grandmother        Social History  Social History     Socioeconomic History     Marital status: Single     Spouse name: Not on file     Number of children: Not on file     Years of education: Not on file     Highest education level: Not on file   Occupational History     Not on file   Social Needs     Financial resource strain: Not on file     Food insecurity     Worry: Not on file     Inability: Not on file     Transportation needs     Medical: Not on " file     Non-medical: Not on file   Tobacco Use     Smoking status: Current Every Day Smoker     Smokeless tobacco: Never Used   Substance and Sexual Activity     Alcohol use: Yes     Drug use: Never     Sexual activity: Never   Lifestyle     Physical activity     Days per week: Not on file     Minutes per session: Not on file     Stress: Not on file   Relationships     Social connections     Talks on phone: Not on file     Gets together: Not on file     Attends Yazidi service: Not on file     Active member of club or organization: Not on file     Attends meetings of clubs or organizations: Not on file     Relationship status: Not on file     Intimate partner violence     Fear of current or ex partner: Not on file     Emotionally abused: Not on file     Physically abused: Not on file     Forced sexual activity: Not on file   Other Topics Concern     Parent/sibling w/ CABG, MI or angioplasty before 65F 55M? Not Asked   Social History Narrative     Not on file        Surgical History:  No past surgical history on file.     Problem List:  Patient Active Problem List   Diagnosis     Attention deficit disorder of childhood     Left otitis media        Allergies:  Allergies   Allergen Reactions     Amoxicillin Diarrhea        Current Meds:    Current Outpatient Medications:      MELATONIN PO, , Disp: , Rfl:      miconazole (MICATIN) 2 % external cream, Apply topically 2 times daily, Disp: 15 g, Rfl: 0     doxycycline (VIBRA-TABS) 100 MG tablet, Take 1 tablet (100 mg) by mouth 2 times daily (Patient not taking: Reported on 8/2/2020), Disp: 20 tablet, Rfl: 0     fluticasone (FLONASE) 50 MCG/ACT spray, Spray 1-2 sprays into both nostrils daily (Patient not taking: Reported on 12/16/2019), Disp: 1 Bottle, Rfl: 11     ibuprofen (ADVIL,MOTRIN) 200 MG tablet, Take 600 mg by mouth every 4 hours as needed., Disp: , Rfl:      Magic Mouthwash (FV std formula) lidocaine visc 2% 2.5mL/5mL & maalox/mylanta w/ simeth 2.5mL/5mL &  diphenhydrAMINE 5mg/5mL, 1:1:1  Si-3 tsp swish and spit every 4 hours prn pain. (Patient not taking: Reported on 2019), Disp: 150 mL, Rfl: 1     oseltamivir (TAMIFLU) 75 MG capsule, Take 1 capsule (75 mg) by mouth 2 times daily (Patient not taking: Reported on 2019), Disp: 10 capsule, Rfl: 0     PHYSICAL EXAM:     Vital signs noted and reviewed by Matthew Warren PA-C  /77 (BP Location: Left arm, Patient Position: Sitting, Cuff Size: Adult Regular)   Pulse 90   Temp 98.3  F (36.8  C) (Tympanic)   Resp 20   Wt 59.1 kg (130 lb 6.4 oz)   SpO2 99%   BMI 17.20 kg/m       PEFR:    Physical Exam  Vitals signs and nursing note reviewed.   Constitutional:       General: He is not in acute distress.     Appearance: He is well-developed. He is not ill-appearing, toxic-appearing or diaphoretic.   HENT:      Head: Normocephalic and atraumatic.      Right Ear: Hearing, tympanic membrane, ear canal and external ear normal. Tympanic membrane is not perforated, erythematous, retracted or bulging.      Left Ear: Hearing, tympanic membrane, ear canal and external ear normal. Tympanic membrane is not perforated, erythematous, retracted or bulging.      Nose: Nose normal. No mucosal edema or rhinorrhea.      Mouth/Throat:      Pharynx: No oropharyngeal exudate or posterior oropharyngeal erythema.      Tonsils: No tonsillar exudate or tonsillar abscesses. 0 on the right. 0 on the left.   Eyes:      Pupils: Pupils are equal, round, and reactive to light.   Neck:      Musculoskeletal: Normal range of motion and neck supple.   Cardiovascular:      Rate and Rhythm: Normal rate and regular rhythm.      Heart sounds: Normal heart sounds, S1 normal and S2 normal. Heart sounds not distant. No murmur. No friction rub. No gallop.    Pulmonary:      Effort: Pulmonary effort is normal. No respiratory distress.      Breath sounds: Normal breath sounds. No decreased breath sounds, wheezing, rhonchi or rales.   Abdominal:       General: Bowel sounds are normal. There is no distension.      Palpations: Abdomen is soft.      Tenderness: There is no abdominal tenderness.      Hernia: There is no hernia in the left inguinal area or right inguinal area.   Genitourinary:     Penis: Normal and circumcised. No phimosis, erythema, tenderness, discharge, swelling or lesions.       Scrotum/Testes:         Right: Mass, tenderness or swelling not present. Right testis is descended.         Left: Mass, tenderness or swelling not present. Left testis is descended.      Epididymis:      Right: Normal. Not inflamed or enlarged. No mass or tenderness.      Left: Normal. Not inflamed or enlarged. No mass or tenderness.      Comments: Skin of the scrotum it erythematous.  Lymphadenopathy:      Cervical: No cervical adenopathy.      Lower Body: No right inguinal adenopathy. No left inguinal adenopathy.   Skin:     General: Skin is warm and dry.      Findings: No rash.   Neurological:      Mental Status: He is alert.      Cranial Nerves: No cranial nerve deficit.   Psychiatric:         Attention and Perception: He is attentive.         Speech: Speech normal.         Behavior: Behavior normal. Behavior is cooperative.         Thought Content: Thought content normal.         Judgment: Judgment normal.          Labs:     No results found for any visits on 08/03/20.    Medical Decision Making:    Differential Diagnosis:  Jock itch.     ASSESSMENT:     1. Jock itch         PLAN:     Rx for antifungal cream today.  Patient instructed to follow up with PCP in 1 week if symptoms are not improving.  Sooner if symptoms worsen.  Worrisome symptoms discussed with instructions to go to the ED.  Patient verbalized understanding and agreed with this plan.     Matthew Warren PA-C  8/3/2020, 1:03 PM

## 2020-08-04 ENCOUNTER — OFFICE VISIT (OUTPATIENT)
Dept: URGENT CARE | Facility: URGENT CARE | Age: 25
End: 2020-08-04
Payer: COMMERCIAL

## 2020-08-04 ENCOUNTER — AMBULATORY - HEALTHEAST (OUTPATIENT)
Dept: FAMILY MEDICINE | Facility: CLINIC | Age: 25
End: 2020-08-04

## 2020-08-04 VITALS
SYSTOLIC BLOOD PRESSURE: 132 MMHG | DIASTOLIC BLOOD PRESSURE: 78 MMHG | BODY MASS INDEX: 17.15 KG/M2 | HEART RATE: 105 BPM | WEIGHT: 130 LBS | TEMPERATURE: 98.6 F | OXYGEN SATURATION: 97 % | RESPIRATION RATE: 16 BRPM

## 2020-08-04 DIAGNOSIS — H61.22 IMPACTED CERUMEN OF LEFT EAR: ICD-10-CM

## 2020-08-04 DIAGNOSIS — H91.92 HEARING REDUCED, LEFT: Primary | ICD-10-CM

## 2020-08-04 DIAGNOSIS — Z20.822 SUSPECTED COVID-19 VIRUS INFECTION: ICD-10-CM

## 2020-08-04 PROCEDURE — 99213 OFFICE O/P EST LOW 20 MIN: CPT | Performed by: NURSE PRACTITIONER

## 2020-08-04 ASSESSMENT — ENCOUNTER SYMPTOMS
NAUSEA: 0
SORE THROAT: 0
VOMITING: 0
RHINORRHEA: 0
SHORTNESS OF BREATH: 0
CHILLS: 0
FEVER: 0
DIARRHEA: 0
COUGH: 0
DIAPHORESIS: 0

## 2020-08-04 ASSESSMENT — PAIN SCALES - GENERAL: PAINLEVEL: MODERATE PAIN (4)

## 2020-08-04 NOTE — PROGRESS NOTES
SUBJECTIVE:   Shay Cordon is a 24 year old male presenting with a chief complaint of   Chief Complaint   Patient presents with     Hearing Problem     hearing lost in Left ear started yesterday and ringing        He is an established patient of [facility].    Left ear pain    Onset of symptoms was 2 day(s) ago.  Course of illness is worsening.    Severity moderate  Current and Associated symptoms: hearing loss, mild discomfort  Treatment measures tried include None tried.  Predisposing factors include None.        Review of Systems   Constitutional: Negative for chills, diaphoresis and fever.   HENT: Positive for hearing loss (left). Negative for congestion, ear pain, rhinorrhea and sore throat.    Respiratory: Negative for cough and shortness of breath.    Gastrointestinal: Negative for diarrhea, nausea and vomiting.   All other systems reviewed and are negative.      Past Medical History:   Diagnosis Date     ADHD (attention deficit hyperactivity disorder)     Dx      Skull fracture (H)      Family History   Problem Relation Age of Onset     Diabetes Father      Gastrointestinal Disease Maternal Grandmother      Current Outpatient Medications   Medication Sig Dispense Refill     miconazole (MICATIN) 2 % external cream Apply topically 2 times daily 15 g 0     doxycycline (VIBRA-TABS) 100 MG tablet Take 1 tablet (100 mg) by mouth 2 times daily (Patient not taking: Reported on 2020) 20 tablet 0     fluticasone (FLONASE) 50 MCG/ACT spray Spray 1-2 sprays into both nostrils daily (Patient not taking: Reported on 2019) 1 Bottle 11     ibuprofen (ADVIL,MOTRIN) 200 MG tablet Take 600 mg by mouth every 4 hours as needed.       Magic Mouthwash (FV std formula) lidocaine visc 2% 2.5mL/5mL & maalox/mylanta w/ simeth 2.5mL/5mL & diphenhydrAMINE 5mg/5mL 1:1:1   Si-3 tsp swish and spit every 4 hours prn pain. (Patient not taking: Reported on 2019) 150 mL 1     MELATONIN PO        oseltamivir  (TAMIFLU) 75 MG capsule Take 1 capsule (75 mg) by mouth 2 times daily (Patient not taking: Reported on 12/16/2019) 10 capsule 0     Social History     Tobacco Use     Smoking status: Current Every Day Smoker     Smokeless tobacco: Never Used   Substance Use Topics     Alcohol use: Yes       OBJECTIVE  /78 (BP Location: Left arm, Patient Position: Sitting, Cuff Size: Adult Regular)   Pulse 105   Temp 98.6  F (37  C) (Tympanic)   Resp 16   Wt 59 kg (130 lb)   SpO2 97%   BMI 17.15 kg/m      Physical Exam  Vitals signs and nursing note reviewed.   Constitutional:       General: He is not in acute distress.     Appearance: He is well-developed. He is not diaphoretic.   HENT:      Head: Normocephalic and atraumatic.      Right Ear: Tympanic membrane and external ear normal.      Left Ear: Tympanic membrane and external ear normal. Decreased hearing noted. There is impacted cerumen.   Eyes:      Pupils: Pupils are equal, round, and reactive to light.   Neck:      Musculoskeletal: Normal range of motion and neck supple.   Pulmonary:      Effort: Pulmonary effort is normal. No respiratory distress.      Breath sounds: Normal breath sounds.   Lymphadenopathy:      Cervical: No cervical adenopathy.   Skin:     General: Skin is warm and dry.   Neurological:      Mental Status: He is alert.      Cranial Nerves: No cranial nerve deficit.         Labs:  No results found for this or any previous visit (from the past 24 hour(s)).      ASSESSMENT:      ICD-10-CM    1. Hearing reduced, left  H91.92    2. Impacted cerumen of left ear  H61.22         PLAN:  Cerumenosis is noted.  Wax is removed by syringing and manual debridement. Patient reports that hearing is better. Instructions for home care to prevent wax buildup are given.      Patient Instructions       Patient Education     Impacted Earwax     Inner ear structures including ear canal and eardrum.     Impacted earwax is a buildup of the natural wax in the ear  (cerumen). Impacted earwax is very common. It can cause symptoms such as hearing loss. It can also make it difficult for a doctor to examine your ear.  Understanding earwax  Tiny glands in your ear make substances that combine with dead skin cells to form earwax. Earwax helps protect your ear canal from water, dirt, infection, and injury. Over time, earwax travels from the inner part of your ear canal to the entrance of the canal. Then it falls away naturally. But in some cases, it can t travel to the entrance of the canal. This may be because of a health condition or objects put in the ear. With age, earwax tends to become harder and less fluid. Older adults are more likely to have problems with earwax buildup.  What causes impacted earwax?  Earwax can build up because of many health conditions. Some cause a physical blockage. Others cause too much earwax to be made. Health conditions that can cause earwax buildup include:    Use of cotton swabs to clean deep in the ear canal    Bony blockage in the ear (osteoma or exostoses)    Infections, such as  infection of the outer ear (external otitis)    Skin disease, such as eczema    Autoimmune diseases, such as lupus    A narrowed ear canal from birth, chronic inflammation, or injury    Too much earwax because of injury    Too much earwax because of  water in the ear canal  Objects repeatedly placed in the ear can also cause impacted earwax. For example, putting cotton swabs in the ear may push the wax deeper into the ear. Over time, this may cause blockage. Hearing aids, swimming plugs, and swim molds can cause the same problem when used again and again.  In some cases, the cause of impacted earwax is not known.  Symptoms of impacted earwax  Excess earwax usually does not cause any symptoms, unless there is a large amount of buildup. Then it may cause symptoms such as:    Hearing loss    Earache    Sense of ear fullness    Itching in the ear    Odor from the ear    Ear  drainage    Dizziness    Ringing in the ears    Cough  Treatment for impacted earwax  If you don t have symptoms, you may not need treatment. Often, the earwax goes away on its own with time. If you have symptoms, you may have one or more treatments such as:    Eardrops to soften the earwax. This helps it leave the ear over time.    Rinsing (irrigation) of the ear canal with water. This is done in a doctor s office.    Removal of the earwax with small tools. This is also done in a doctor s office.  In rare cases, some treatments for earwax removal may cause complications such as:    Infection of the outer ear (otitis external)    Earache    Short-term hearing loss    Dizziness    Water trapped in the ear canal    Hole in the eardrum    Ringing in the ears    Bleeding from the ear  Talk with your healthcare provider about which risks apply most to you.  Healthcare providers do not advise use of ear candles or ear vacuum kits. These methods are not shown to work and may cause problems.  Preventing impacted earwax  You may not be able to prevent impacted earwax if you have a health condition that causes it, such as eczema. In other cases, you may be able to prevent earwax buildup by:    Using ear drops once a week    Having routine cleaning of the ear about every 6 months    Not using cotton swabs in the ear  Call your healthcare provider if you have symptoms of impacted earwax. Also call right away if you have severe symptoms after earwax removal. These may include bleeding or severe ear pain.  Date Last Reviewed: 5/1/2017 2000-2019 [employer]. [address] [place] [address], [place] [address]. All rights reserved. This information is not intended as a substitute for professional medical care. Always follow your healthcare professional's instructions.

## 2020-08-04 NOTE — PATIENT INSTRUCTIONS
Patient Education     Impacted Earwax     Inner ear structures including ear canal and eardrum.     Impacted earwax is a buildup of the natural wax in the ear (cerumen). Impacted earwax is very common. It can cause symptoms such as hearing loss. It can also make it difficult for a doctor to examine your ear.  Understanding earwax  Tiny glands in your ear make substances that combine with dead skin cells to form earwax. Earwax helps protect your ear canal from water, dirt, infection, and injury. Over time, earwax travels from the inner part of your ear canal to the entrance of the canal. Then it falls away naturally. But in some cases, it can t travel to the entrance of the canal. This may be because of a health condition or objects put in the ear. With age, earwax tends to become harder and less fluid. Older adults are more likely to have problems with earwax buildup.  What causes impacted earwax?  Earwax can build up because of many health conditions. Some cause a physical blockage. Others cause too much earwax to be made. Health conditions that can cause earwax buildup include:    Use of cotton swabs to clean deep in the ear canal    Bony blockage in the ear (osteoma or exostoses)    Infections, such as  infection of the outer ear (external otitis)    Skin disease, such as eczema    Autoimmune diseases, such as lupus    A narrowed ear canal from birth, chronic inflammation, or injury    Too much earwax because of injury    Too much earwax because of  water in the ear canal  Objects repeatedly placed in the ear can also cause impacted earwax. For example, putting cotton swabs in the ear may push the wax deeper into the ear. Over time, this may cause blockage. Hearing aids, swimming plugs, and swim molds can cause the same problem when used again and again.  In some cases, the cause of impacted earwax is not known.  Symptoms of impacted earwax  Excess earwax usually does not cause any symptoms, unless there is a  large amount of buildup. Then it may cause symptoms such as:    Hearing loss    Earache    Sense of ear fullness    Itching in the ear    Odor from the ear    Ear drainage    Dizziness    Ringing in the ears    Cough  Treatment for impacted earwax  If you don t have symptoms, you may not need treatment. Often, the earwax goes away on its own with time. If you have symptoms, you may have one or more treatments such as:    Eardrops to soften the earwax. This helps it leave the ear over time.    Rinsing (irrigation) of the ear canal with water. This is done in a doctor s office.    Removal of the earwax with small tools. This is also done in a doctor s office.  In rare cases, some treatments for earwax removal may cause complications such as:    Infection of the outer ear (otitis external)    Earache    Short-term hearing loss    Dizziness    Water trapped in the ear canal    Hole in the eardrum    Ringing in the ears    Bleeding from the ear  Talk with your healthcare provider about which risks apply most to you.  Healthcare providers do not advise use of ear candles or ear vacuum kits. These methods are not shown to work and may cause problems.  Preventing impacted earwax  You may not be able to prevent impacted earwax if you have a health condition that causes it, such as eczema. In other cases, you may be able to prevent earwax buildup by:    Using ear drops once a week    Having routine cleaning of the ear about every 6 months    Not using cotton swabs in the ear  Call your healthcare provider if you have symptoms of impacted earwax. Also call right away if you have severe symptoms after earwax removal. These may include bleeding or severe ear pain.  Date Last Reviewed: 5/1/2017 2000-2019 [employer]. [address] [place] [address], [place] [address]. All rights reserved. This information is not intended as a substitute for professional medical care. Always follow your healthcare professional's  instructions.

## 2020-08-05 ENCOUNTER — NURSE TRIAGE (OUTPATIENT)
Dept: NURSING | Facility: CLINIC | Age: 25
End: 2020-08-05

## 2020-08-05 NOTE — TELEPHONE ENCOUNTER
Coronavirus (COVID-19) Notification     Reason for call  Patient requesting results     Lab Result    Lab test 2019-nCoV rRt-PCR in process        RN Recommendations/Instructions per [facility]  Continue quarantee and following instructions until you receive the results     Please Contact your PCP clinic or return to the Emergency department if your:    Symptoms worsen or other concerning symptom's.     Patient informed that if test for COVID19 is POSITIVE,  you will receive a call typically within 48 hours from the test date (date lab collected).  If NEGATIVE result, you will receive a letter in the mail or [facility].      [RN/LPN Name]  Tabitha Knight RN  FNA      Additional Information    [1] Follow-up call to recent contact AND [2] information only call, no triage required    Protocols used: INFORMATION ONLY CALL-A-

## 2020-08-07 ENCOUNTER — COMMUNICATION - HEALTHEAST (OUTPATIENT)
Dept: SCHEDULING | Facility: CLINIC | Age: 25
End: 2020-08-07

## 2020-08-17 ENCOUNTER — OFFICE VISIT (OUTPATIENT)
Dept: URGENT CARE | Facility: URGENT CARE | Age: 25
End: 2020-08-17
Payer: COMMERCIAL

## 2020-08-17 VITALS
HEIGHT: 72 IN | BODY MASS INDEX: 17.82 KG/M2 | RESPIRATION RATE: 18 BRPM | SYSTOLIC BLOOD PRESSURE: 126 MMHG | HEART RATE: 93 BPM | DIASTOLIC BLOOD PRESSURE: 75 MMHG | WEIGHT: 131.6 LBS | TEMPERATURE: 99 F | OXYGEN SATURATION: 100 %

## 2020-08-17 DIAGNOSIS — L73.9 FOLLICULITIS: Primary | ICD-10-CM

## 2020-08-17 PROCEDURE — 99213 OFFICE O/P EST LOW 20 MIN: CPT | Performed by: FAMILY MEDICINE

## 2020-08-17 RX ORDER — CEPHALEXIN 500 MG/1
500 CAPSULE ORAL 3 TIMES DAILY
Qty: 15 CAPSULE | Refills: 0 | Status: SHIPPED | OUTPATIENT
Start: 2020-08-17 | End: 2020-08-22

## 2020-08-17 ASSESSMENT — PAIN SCALES - GENERAL: PAINLEVEL: NO PAIN (0)

## 2020-08-17 ASSESSMENT — ENCOUNTER SYMPTOMS
SORE THROAT: 0
RHINORRHEA: 0
CHILLS: 0
DIFFICULTY URINATING: 0
DIAPHORESIS: 0
WOUND: 0
DYSURIA: 0
FEVER: 0
COUGH: 0
SHORTNESS OF BREATH: 0
FREQUENCY: 0
NAUSEA: 0
VOMITING: 0
DIARRHEA: 0

## 2020-08-17 ASSESSMENT — MIFFLIN-ST. JEOR: SCORE: 1619.93

## 2020-08-17 NOTE — PROGRESS NOTES
SUBJECTIVE:   Shay Cordon is a 24 year old male presenting with a chief complaint of   Chief Complaint   Patient presents with     Derm Problem     Pt noticed sores on his penis yesterday      Shay presents with a nodule on the right side of his penis over the past 24 hours.    Did have recent STD screening within the last year that was otherwise negative indicates monogamous relationship with his girlfriend.      Is associated dysuria frequency or groin swelling denies any inguinal swelling.  Denies dysuria or hematuria.  Or testicular pain.        Review of Systems   Constitutional: Negative for chills, diaphoresis and fever.   HENT: Negative for congestion, ear pain, rhinorrhea and sore throat.    Respiratory: Negative for cough and shortness of breath.    Gastrointestinal: Negative for diarrhea, nausea and vomiting.   Genitourinary: Positive for genital sores (isolated on right side of penis). Negative for decreased urine volume, difficulty urinating, dysuria, frequency, penile swelling, scrotal swelling, testicular pain and urgency.   Skin: Negative for rash and wound.       Past Medical History:   Diagnosis Date     ADHD (attention deficit hyperactivity disorder)     Dx      Skull fracture (H)      Family History   Problem Relation Age of Onset     Diabetes Father      Gastrointestinal Disease Maternal Grandmother      Current Outpatient Medications   Medication Sig Dispense Refill     ibuprofen (ADVIL,MOTRIN) 200 MG tablet Take 600 mg by mouth every 4 hours as needed.       doxycycline (VIBRA-TABS) 100 MG tablet Take 1 tablet (100 mg) by mouth 2 times daily (Patient not taking: Reported on 2020) 20 tablet 0     fluticasone (FLONASE) 50 MCG/ACT spray Spray 1-2 sprays into both nostrils daily (Patient not taking: Reported on 2019) 1 Bottle 11     Magic Mouthwash (FV std formula) lidocaine visc 2% 2.5mL/5mL & maalox/mylanta w/ simeth 2.5mL/5mL & diphenhydrAMINE 5mg/5mL 1:1:1   Si-3 tsp  "swish and spit every 4 hours prn pain. (Patient not taking: Reported on 12/16/2019) 150 mL 1     MELATONIN PO        miconazole (MICATIN) 2 % external cream Apply topically 2 times daily (Patient not taking: Reported on 8/17/2020) 15 g 0     oseltamivir (TAMIFLU) 75 MG capsule Take 1 capsule (75 mg) by mouth 2 times daily (Patient not taking: Reported on 12/16/2019) 10 capsule 0     Social History     Tobacco Use     Smoking status: Current Every Day Smoker     Smokeless tobacco: Never Used   Substance Use Topics     Alcohol use: Yes       OBJECTIVE  /75 (BP Location: Left arm, Patient Position: Chair, Cuff Size: Adult Regular)   Pulse 93   Temp 99  F (37.2  C) (Oral)   Resp 18   Ht 1.829 m (6')   Wt 59.7 kg (131 lb 9.6 oz)   SpO2 100%   BMI 17.85 kg/m      Physical Exam  Neck:      Musculoskeletal: Normal range of motion.   Cardiovascular:      Rate and Rhythm: Normal rate.      Pulses: Normal pulses.      Heart sounds: Normal heart sounds.   Pulmonary:      Breath sounds: Normal breath sounds.   Genitourinary:     Penis: Normal.       Scrotum/Testes: Normal.      Comments: Normal circumcised penis.  Does have a focal nodule on the right dorsal aspect of the midshaft of the penis appears to be consistent with a \"pimple \"or isolated folliculitis  Musculoskeletal: Normal range of motion.   Skin:     General: Skin is warm.      Capillary Refill: Capillary refill takes less than 2 seconds.   Neurological:      General: No focal deficit present.      Mental Status: He is alert.   Psychiatric:         Mood and Affect: Mood normal.         ASSESSMENT:    ICD-10-CM    1. Folliculitis  L73.9 cephALEXin (KEFLEX) 500 MG capsule        PLAN:  We will monitor for spontaneous resolution with warm compresses.  I did provide Keflex as backup prescription if there is increasing redness or swelling at the site he can start the Keflex.  Bacitracin ointment was provided.  Return to see us immediately for any " worsening.    Saúl Tejada MD         <<-----Click on this checkbox to enter Post-Op Dx

## 2021-08-01 ENCOUNTER — HEALTH MAINTENANCE LETTER (OUTPATIENT)
Age: 26
End: 2021-08-01

## 2021-09-26 ENCOUNTER — HEALTH MAINTENANCE LETTER (OUTPATIENT)
Age: 26
End: 2021-09-26

## 2022-08-27 ENCOUNTER — HEALTH MAINTENANCE LETTER (OUTPATIENT)
Age: 27
End: 2022-08-27

## 2023-01-08 ENCOUNTER — HEALTH MAINTENANCE LETTER (OUTPATIENT)
Age: 28
End: 2023-01-08

## 2023-09-23 ENCOUNTER — HEALTH MAINTENANCE LETTER (OUTPATIENT)
Age: 28
End: 2023-09-23

## 2025-07-16 ENCOUNTER — OFFICE VISIT (OUTPATIENT)
Dept: URGENT CARE | Facility: URGENT CARE | Age: 30
End: 2025-07-16

## 2025-07-16 ENCOUNTER — ANCILLARY PROCEDURE (OUTPATIENT)
Dept: GENERAL RADIOLOGY | Facility: CLINIC | Age: 30
End: 2025-07-16
Attending: PHYSICIAN ASSISTANT

## 2025-07-16 VITALS
HEIGHT: 72 IN | RESPIRATION RATE: 20 BRPM | HEART RATE: 100 BPM | SYSTOLIC BLOOD PRESSURE: 110 MMHG | BODY MASS INDEX: 17.73 KG/M2 | WEIGHT: 130.9 LBS | TEMPERATURE: 98.2 F | DIASTOLIC BLOOD PRESSURE: 77 MMHG | OXYGEN SATURATION: 97 %

## 2025-07-16 DIAGNOSIS — S99.921A INJURY OF RIGHT FOOT, INITIAL ENCOUNTER: ICD-10-CM

## 2025-07-16 DIAGNOSIS — S92.301A CLOSED AVULSION FRACTURE OF METATARSAL BONE OF RIGHT FOOT, INITIAL ENCOUNTER: Primary | ICD-10-CM

## 2025-07-16 PROCEDURE — 99204 OFFICE O/P NEW MOD 45 MIN: CPT | Performed by: PHYSICIAN ASSISTANT

## 2025-07-16 PROCEDURE — 73630 X-RAY EXAM OF FOOT: CPT | Mod: TC | Performed by: RADIOLOGY

## 2025-07-16 ASSESSMENT — PAIN SCALES - GENERAL: PAINLEVEL_OUTOF10: MODERATE PAIN (6)

## 2025-07-16 NOTE — PROGRESS NOTES
Urgent Care Clinic Visit    Chief Complaint   Patient presents with    Musculoskeletal Problem     Sprained right foot, onset yesterday               7/16/2025     1:46 PM   Additional Questions   Roomed by Natali FOY   Accompanied by self         7/16/2025   Forms   Any forms needing to be completed Yes

## 2025-07-16 NOTE — PATIENT INSTRUCTIONS
Ice, elevate, Tylenol and NSAIDs as needed for pain.    Ibuprofen 400-600 mg (2-3 of the 200 mg OTC tablets or 400-600 mg of the children's liquid) up to 4 times daily with food or milk  Tylenol 500-1000 mg every 8 hours as needed  While weightbearing for the next 1 to 2 weeks would like you to use the postop shoe  Follow-up with orthopedics in 1 to 2 weeks for further evaluation and management and directions on work restrictions  I included a work note for the rest of the week for you  Return as tolerated

## 2025-07-16 NOTE — PROGRESS NOTES
Chief Complaint   Patient presents with    Musculoskeletal Problem     Sprained right foot, onset yesterday       Assessment & Plan  Assessment  - Avulsion fracture of the base of the fifth metatarsal.    Plan  - Obtain a foot X-ray to rule out any broken bones.  - Provide a postop shoe to help distribute weight evenly while walking; wear for about a week and advance as tolerated.  - Schedule a follow-up appointment with a podiatrist in about a week to ensure proper healing and receive further guidance.  - Advise staying out of work for the rest of the week, with a gradual return as tolerated.  - Recommend elevating and icing the foot at home to reduce swelling.  - Suggest taking Tylenol and ibuprofen for pain management.  - Instruct to wear the boot whenever walking to provide support and comfort.     ICD-10-CM    1. Closed avulsion fracture of metatarsal bone of right foot, initial encounter  S92.301A Ankle/Foot Bracing Supplies Order Post-op Shoe; Right     Orthopedic  Referral      2. Injury of right foot, initial encounter  S99.921A XR Foot Right G/E 3 Views          SUBJECTIVE:  History of Present Illness-Shay Cordon, 29-year-old male, reports rolling his ankle at work on July 15, 2025, around 3 PM.  He works as an [employer] .  Describes immediate pain localized to the lateral aspect of the foot and some pain slightly higher up, with soreness attributed to altered gait. Able to bear weight after the injury. Noted some redness, swelling, and bruising. Has a history of rolling the same ankle previously but denies any prior fractures. Has performed minimal icing since the injury. Denies pain higher up the leg except for mild soreness from walking. No mention of other symptoms.     ROS: Pertinent ROS neg other than the symptoms noted above in the HPI.     OBJECTIVE:  /77 (BP Location: Left arm, Patient Position: Sitting, Cuff Size: Adult Regular)   Pulse 100   Temp 98.2  F (36.8  C)  (Oral)   Resp 20   Ht 1.829 m (6')   Wt 59.4 kg (130 lb 14.4 oz)   SpO2 97%   BMI 17.75 kg/m     Physical Exam  - MUSCULOSKELETAL: Tenderness around the base of the fifth metatarsal. Ecchymosis, erythema, and edema observed.  No tenderness of the knee, no other ankle or foot bony tenderness  - SKIN: Ecchymosis and erythema observed on the foot.      DIAGNOSTICS    Results- Avulsion fracture of metatarsal bone of right foot  No results found for any visits on 25.     Farshad Browning PA-C    Consent was obtained from the patient to use an AI documentation tool in the creation of this note.  Any grammatical or spelling errors are non-intentional. Please contact the author of this note directly if you are in need of any clarification.     Current Outpatient Medications   Medication Sig Dispense Refill    ibuprofen (ADVIL,MOTRIN) 200 MG tablet Take 600 mg by mouth every 4 hours as needed.      doxycycline (VIBRA-TABS) 100 MG tablet Take 1 tablet (100 mg) by mouth 2 times daily (Patient not taking: Reported on 2025) 20 tablet 0    fluticasone (FLONASE) 50 MCG/ACT spray Spray 1-2 sprays into both nostrils daily (Patient not taking: Reported on 2025) 1 Bottle 11    Magic Mouthwash (FV std formula) lidocaine visc 2% 2.5mL/5mL & maalox/mylanta w/ simeth 2.5mL/5mL & diphenhydrAMINE 5mg/5mL 1:1:1   Si-3 tsp swish and spit every 4 hours prn pain. (Patient not taking: Reported on 2025) 150 mL 1    MELATONIN PO  (Patient not taking: Reported on 2025)      miconazole (MICATIN) 2 % external cream Apply topically 2 times daily (Patient not taking: Reported on 2025) 15 g 0    oseltamivir (TAMIFLU) 75 MG capsule Take 1 capsule (75 mg) by mouth 2 times daily (Patient not taking: Reported on 2025) 10 capsule 0     No current facility-administered medications for this visit.      Patient Active Problem List   Diagnosis    Attention deficit disorder of childhood    Left otitis media      Past  Medical History:   Diagnosis Date    ADHD (attention deficit hyperactivity disorder)     Dx 2002    Skull fracture (H) 5/04     No past surgical history on file.  Family History   Problem Relation Age of Onset    Diabetes Father     Gastrointestinal Disease Maternal Grandmother      Social History     Tobacco Use    Smoking status: Every Day     Passive exposure: Never    Smokeless tobacco: Never   Substance Use Topics    Alcohol use: Yes

## 2025-07-16 NOTE — LETTER
July 16, 2025      Shay Cordon  [address]  [place] [facility] [address]        To Whom It May Concern:    Shay Cordon was seen  in the clinic today. Please excuse them from work/school until 7/20/25        Sincerely,      Farshad Browning        Electronically signed

## 2025-08-03 ENCOUNTER — HEALTH MAINTENANCE LETTER (OUTPATIENT)
Age: 30
End: 2025-08-03